# Patient Record
Sex: FEMALE | Race: BLACK OR AFRICAN AMERICAN
[De-identification: names, ages, dates, MRNs, and addresses within clinical notes are randomized per-mention and may not be internally consistent; named-entity substitution may affect disease eponyms.]

---

## 2017-03-18 NOTE — DIAGNOSTIC IMAGING REPORT
EXAMINATION: PELVIC ULTRASOUND



CLINICAL HISTORY: lower abd pain on right     



COMPARISON STUDY:  None



FINDINGS: 

The uterus measured 7.6 cm. Central gestational sac appearing process at the

level of the uterine fundus. A fetal pole is not identified..

The endometrial stripe measured difficult to define.

The right ovary measured not well seen.

The left ovary measured 2.3 cm maximum dimension. Normal vascular flow.

Study is difficult to interpret as there is a suggestion of bulky masslike

changes within the pelvis and uterine regions. These appear to be primarily

solid with Minimal cystic components. Etiology is unclear. Endometriosis is a

diagnostic possibility as are multiple exophytic fibroids.

There was no evidence of pathologic free pelvic fluid.



IMPRESSION:  

1. Difficult scan to interpret.

2. Abnormal soft tissue occupying the bulk of the pelvis and pelvic cul-de-sac

in a periuterine distribution.

3. Possible empty intrauterine gestational sac

4. Exophytic fibroids, endometriomas, versus other diagnostic possibilities

exist.

5. CT study of the abdominal and pelvic region is suggested







Electronically signed by:  David Max M.D.

3/18/2017 3:27 PM



Dictated Date/Time:  3/18/2017 3:23 PM

## 2017-03-18 NOTE — EMERGENCY ROOM VISIT NOTE
History


Report prepared by Nadege:  Olvin Gillette


Under the Supervision of:  Dr. Ole Bae D.O.


First contact with patient:  13:28


Chief Complaint:  PELVIC  PAIN


Stated Complaint:  PELVIC PAIN





History of Present Illness


The patient is a 21 year old female who presents to the Emergency Room with 

complaints of persistent lower pelvic pain beginning about 3 days ago. She 

notes she is not due for her cycle for another 10 days. She notes she started 

having white, non-foul smelling discharge beginning this morning, and began 

bleeding just recently this afternoon while in the ER. She reports it is not 

normal for her to have discharge before her period. The patient notes having 

similar pain before in the past which was determined to be an ovarian cyst that 

was not removed. She has not had any pain or burning with urination. She has 

been unable to pass gas, and has not have a full bowel movement. She is 

sexually active and still has her gallbladder and appendix. She has not started 

any new medications.  She does admit to a history of previous ovarian cyst.  

Last mental.  Was just about 2 weeks ago.





   Source of History:  patient


   Onset:  about 3 days ago


   Position:  pelvis (lower)


   Quality:  other (pelvic pain)


   Timing:  other (persistent)


   Associated Symptoms:  No urinary symptoms


Note:


The patient reports having white discharge, vaginal bleeding, and difficulty 

with bowel movements.





Review of Systems


See HPI for pertinent positives & negatives. A total of 10 systems reviewed and 

were otherwise negative.





Past Medical & Surgical


Medical Problems:


(1) Lupus


(2) Pulmonary embolism


(3) TIA (transient ischemic attack)








Family History


No pertinent family history stated.





Social History


Smoking Status:  Current Every Day Smoker


Alcohol Use:  none


Drug Use:  none


Occupation Status:  [employer] student





Current/Historical Medications


Scheduled


Dextromethorphan-Phenylephrine (Day Time Cold/Flu Relief), 30 ML PO DAILY


Hydroxychloroquine Sulfate (Plaquenil), 200 MG PO DAILY


Mycophenolate Mofetil (Cellcept), 1,000 MG PO DAILY


Warfarin Sodium (Coumadin), 8 MG PO DAILY@2200





Scheduled PRN


Ibuprofen (Advil), 200-600 MG PO Q4H PRN for Pain


Oxycodone Immediate Rel Tab (Roxicodone Ir), 5 MG PO Q6H PRN for Pain





Allergies


Coded Allergies:  


     Heparin (Verified  Allergy, Intermediate, Hives, 3/18/17)


 Patient reports that she can take LMWH without problem;


 gets hives with UFH


     Fondaparinux (Verified  Allergy, Mild, HIVES, 3/18/17)





Physical Exam


Vital Signs











  Date Time  Temp Pulse Resp B/P Pulse Ox O2 Delivery O2 Flow Rate FiO2


 


3/18/17 18:15  67  98/66 100 Room Air  


 


3/18/17 16:34  65 20 116/69 95   


 


3/18/17 13:00 36.9 86 20 118/87 97 Room Air  











Physical Exam


GENERAL: Sitting up in bed, alert, well appearing, well nourished, no distress, 

non-toxic 


EYE EXAM: normal conjunctiva


OROPHARYNX: no exudate, no erythema, lips, buccal mucosa, and tongue normal and 

mucous membranes are moist


NECK: supple, no nuchal rigidity, no adenopathy, non-tender


LUNGS: Clear to auscultation. Normal chest wall mechanics


HEART: no murmurs, S1 normal and S2 normal 


ABDOMEN: abdomen soft; minimal tenderness in the suprapubic and right lower 

quadrant; normo-active bowel sounds, no masses, no rebound or guarding. 


BACK: Back is symmetrical on inspection and there is no deformity, no midline 

tenderness, no CVA tenderness. 


SKIN: no rashes and no bruising 


UPPER EXTREMITIES: upper extremities are grossly normal. 


LOWER EXTREMITIES: No pitting edema.


NEURO EXAM: Normal sensorium, cranial nerves II-XII grossly intact, normal 

speech,  no gross weakness of arms, no gross weakness of legs. Gross sensation 

intact.


PELVIC: Normal external genitalia; normal vaginal mucosa; small amount of blood 

in the vaginal vault; cervix is closed.





Medical Decision & Procedures


ER Provider


Diagnostic Interpretation:


Radiology results have been interpreted by the radiologist and reviewed by me.





ABDOMEN AND PELVIS CT WITH IV AND ORAL CONTRAST





FINDINGS: Lung bases are clear. Liver spleen and pancreas are uniform. Kidneys


enhance uniformly.





The upper abdominal bowel pattern is nonobstructive. Gallbladder is negative for


distention.





There is a small amount of free fluid within the right paracolic gutter region.


Terminal ileum is unremarkable. The appendix appears to be identified medially


anterior to the right iliopsoas musculature shows no definitive inflammatory


change.





Evaluation of the pelvis shows a small amount of free ascites. Appears to be a


complex soft tissue mass displacing the uterus anteriorly. The ovaries are not


identifiable as a independent entities. The soft tissue mass component with


cystic components measures 9 x 9 cm. There is again a trace amount of free


ascites. Endometrium at the level of the uterine fundus is thickened at 2 cm.


There is fluid within the central canal at the level of the fundus. Bladder is


midline.





Etiology is uncertain on the absence of additional data. Possibility of


endometrioma versus diffuse pelvic inflammatory change demonstrated


consideration. There are pregnancy test was positive the possibility of a


ruptured ectopic may be a consideration. Nevertheless, the bulk of the findings


appear to relate to soft tissue findings.





IMPRESSION: 





1. Large abnormal soft tissue mass occupying the bulk of the low pelvis.








2. Maximum dimensions are 9 x 9 cm, with evidence for small components of cystic


degeneration.


3. Diagnostic considerations must include endometriosis, pelvic inflammatory


change, versus a variety of other potential etiologies.


4. The appendix is not well seen although its segmental visualized components


appear unremarkable.


5. Trace ascites within the pelvis and right paracolic gutter region.


6. Somewhat distended fundal central uterine canal with mild endometrial


prominence 


7. GYN consultation is suggested





Electronically signed by:  David Max M.D.


3/18/2017 5:03 PM





Dictated Date/Time:  3/18/2017 4:55 PM





EXAMINATION: PELVIC ULTRASOUND





FINDINGS: 


The uterus measured 7.6 cm. Central gestational sac appearing process at the


level of the uterine fundus. A fetal pole is not identified..


The endometrial stripe measured difficult to define.


The right ovary measured not well seen.


The left ovary measured 2.3 cm maximum dimension. Normal vascular flow.


Study is difficult to interpret as there is a suggestion of bulky masslike


changes within the pelvis and uterine regions. These appear to be primarily


solid with Minimal cystic components. Etiology is unclear. Endometriosis is a


diagnostic possibility as are multiple exophytic fibroids.


There was no evidence of pathologic free pelvic fluid.





IMPRESSION:  


1. Difficult scan to interpret.


2. Abnormal soft tissue occupying the bulk of the pelvis and pelvic cul-de-sac


in a periuterine distribution.


3. Possible empty intrauterine gestational sac


4. Exophytic fibroids, endometriomas, versus other diagnostic possibilities


exist.


5. CT study of the abdominal and pelvic region is suggested





Electronically signed by:  David Max M.D.


3/18/2017 3:27 PM





Dictated Date/Time:  3/18/2017 3:23 PM





Laboratory Results


3/18/17 14:05








Red Blood Count 4.60, Mean Corpuscular Volume 81.7, Mean Corpuscular Hemoglobin 

27.6, Mean Corpuscular Hemoglobin Concent 33.8, Mean Platelet Volume 10.2, 

Neutrophils (%) (Auto) 69.0, Lymphocytes (%) (Auto) 22.2, Monocytes (%) (Auto) 

6.7, Eosinophils (%) (Auto) 1.5, Basophils (%) (Auto) 0.2, Neutrophils # (Auto) 

3.30, Lymphocytes # (Auto) 1.06, Monocytes # (Auto) 0.32, Eosinophils # (Auto) 

0.07, Basophils # (Auto) 0.01





3/18/17 14:05

















Test


  3/18/17


14:05 3/18/17


14:10 3/18/17


17:30


 


White Blood Count


  4.78 K/uL


(4.8-10.8) 


  


 


 


Red Blood Count


  4.60 M/uL


(4.2-5.4) 


  


 


 


Hemoglobin


  12.7 g/dL


(12.0-16.0) 


  


 


 


Hematocrit 37.6 % (37-47)   


 


Mean Corpuscular Volume


  81.7 fL


() 


  


 


 


Mean Corpuscular Hemoglobin


  27.6 pg


(25-34) 


  


 


 


Mean Corpuscular Hemoglobin


Concent 33.8 g/dl


(32-36) 


  


 


 


Platelet Count


  257 K/uL


(130-400) 


  


 


 


Mean Platelet Volume


  10.2 fL


(7.4-10.4) 


  


 


 


Neutrophils (%) (Auto) 69.0 %   


 


Lymphocytes (%) (Auto) 22.2 %   


 


Monocytes (%) (Auto) 6.7 %   


 


Eosinophils (%) (Auto) 1.5 %   


 


Basophils (%) (Auto) 0.2 %   


 


Neutrophils # (Auto)


  3.30 K/uL


(1.4-6.5) 


  


 


 


Lymphocytes # (Auto)


  1.06 K/uL


(1.2-3.4) 


  


 


 


Monocytes # (Auto)


  0.32 K/uL


(0.11-0.59) 


  


 


 


Eosinophils # (Auto)


  0.07 K/uL


(0-0.5) 


  


 


 


Basophils # (Auto)


  0.01 K/uL


(0-0.2) 


  


 


 


RDW Standard Deviation


  42.6 fL


(36.4-46.3) 


  


 


 


RDW Coefficient of Variation


  14.4 %


(11.5-14.5) 


  


 


 


Immature Granulocyte % (Auto) 0.4 %   


 


Immature Granulocyte # (Auto)


  0.02 K/uL


(0.00-0.02) 


  


 


 


Anion Gap


  11.0 mmol/L


(3-11) 


  


 


 


Est Creatinine Clear Calc


Drug Dose 132.5 ml/min 


  


  


 


 


Estimated GFR (


American) > 150.0 


  


  


 


 


Estimated GFR (Non-


American 134.2 


  


  


 


 


BUN/Creatinine Ratio 11.8 (10-20)   


 


Calcium Level


  9.4 mg/dl


(8.5-10.1) 


  


 


 


Total Bilirubin


  0.3 mg/dl


(0.2-1) 


  


 


 


Direct Bilirubin


  < 0.1 mg/dl


(0-0.2) 


  


 


 


Aspartate Amino Transf


(AST/SGOT) 22 U/L (15-37) 


  


  


 


 


Alanine Aminotransferase


(ALT/SGPT) 21 U/L (12-78) 


  


  


 


 


Alkaline Phosphatase


  140 U/L


() 


  


 


 


Total Protein


  9.9 gm/dl


(6.4-8.2) 


  


 


 


Albumin


  3.7 gm/dl


(3.4-5.0) 


  


 


 


Lipase


  75 U/L


() 


  


 


 


Urine Color  YELLOW  


 


Urine Appearance  CLEAR (CLEAR)  


 


Urine pH  6.0 (4.5-7.5)  


 


Urine Specific Gravity


  


  >= 1.030


(1.000-1.030) 


 


 


Urine Protein  1+ (NEG)  


 


Urine Glucose (UA)  NEG (NEG)  


 


Urine Ketones  TRACE (NEG)  


 


Urine Occult Blood  3+ (NEG)  


 


Urine Nitrite  NEG (NEG)  


 


Urine Bilirubin  NEG (NEG)  


 


Urine Urobilinogen  NEG (NEG)  


 


Urine Leukocyte Esterase  NEG (NEG)  


 


Urine RBC


  


  5-10 /hpf


(0-4) 


 


 


Urine WBC  1-5 /hpf (0-5)  


 


Urine Epithelial Cells  >30 /lpf (0-5)  


 


Urine Bacteria  1+ (NEG)  


 


Urine Hyaline Casts


  


  5-10 /lpf


(0-5) 


 


 


Urine Mucus


  


  PRESENT (NONE


PRSENT) 


 


 


Urine Pregnancy Test  NEG (NEG)  





Laboratory results per my review.





Medications Administered











 Medications


  (Trade)  Dose


 Ordered  Sig/Tyler


 Route  Start Time


 Stop Time Status Last Admin


Dose Admin


 


 Sodium Chloride


  (Nss 1000ml)  1,000 ml @ 


 999 mls/hr  Q1H1M STAT


 IV  3/18/17 13:52


 3/18/17 14:52 DC 3/18/17 14:10


999 MLS/HR


 


 Ondansetron HCl


  (Zofran Inj)  4 mg  NOW  STAT


 IV  3/18/17 13:52


 3/18/17 13:54 DC 3/18/17 14:10


4 MG


 


 Ketorolac


 Tromethamine


  (Toradol Inj)  30 mg  NOW  STAT


 IV  3/18/17 13:52


 3/18/17 13:54 DC 3/18/17 14:11


30 MG











ED Course


ED COURSE: 


Vital signs were reviewed and showed normal. 


The patients medical record was reviewed


The above diagnostic studies were performed and reviewed.


ED treatments and interventions as stated above. 





1342: The patient was evaluated in room C1B. A complete history and physical 

examination was performed.





1352: Ordered Toradol Inj 30 mg IV, Zofran Inj 4 mg IV, and NSS 1,000 ml @ 999 

mls/hr IV.





1745: I spoke with Dr. Tobias. He will see the patient in his office in 2 

days.





1610: I reassessed the patient and she is doing well.





1615: Upon reevaluation, the patient is doing well.I discussed my findings with 

the patient and she understands and agrees with the treatment plan.   


Based on the patients age, coexisting illnesses, exam and lab findings the 

decision to treat as an outpatient was made.


The patient remained stable while under my care.


The patient appeared well at the time of discharge.





Medical Decision


Differential diagnoses includes but is not limited to gastritis, peptic ulcer 

disease, GERD, gallbladder disease, pancreatitis, small bowel obstruction, 

acute coronary syndrome, pericarditis, ischemic bowel, irritable bowel disease, 

irritable bowel syndrome, appendicitis, diverticulitis, malignancy, hernia, 

urinary tract infection, torsion, perforation, trauma, infectious. 





Patient is a 21-year-old female who presents the ER for abdominal pain which 

started 3 days ago.  She notes that it's in her lower pelvic region.  She does 

describe a faint white vaginal discharge which has now turned into vaginal 

bleeding today.  Last menstrual cycle was about 2 weeks ago.  Urine pregnancy 

was negative.  She is due for period  in about 10 days.  Labs show no 

significant leukocytosis or anemia.  BMP along with LFTs, bilirubin and lipase 

is unremarkable.  UA was contaminated with greater than 30 epithelial cells.  

GC ankle many were pending.  Ultrasound of pelvis shows a large pelvic mass 

with flow to the left ovary.  The right ovary was not well seen secondary to a 

mass.  CT of her abdomen pelvis confirms this and they're uncertain of the true 

cause of it.  Patient rested fairly comfortably and was discussed with 

gynecology.  Patient was given a dose of Toradol and felt significantly better.

  She is discharged follow-up with oncology on Monday.  She was given a 

prescription of OxyIR. I favor that her symptoms are likely secondary to this 

large mass. Discussed with Pt concerning signs and symptoms to watch out for. 

Pt was instructed to follow up with their PCP and discussed with the patient 

their option to return to the ED at anytime for persistent or worsening 

symptoms. The appropriate anticipatory guidance and out-patient management, 

including indications for return to the emergency department, were explained at 

length to the patient and understood.





[place] Drug Monitoring Program


Search Results:  patient reviewed within database, no issues identified





Consults


Time Called:  1740


Consulting Physician:  Dr. Tobias


Returned Call:  1745


 I spoke with Dr. Tobias. He will see the patient in his office in 2 days.





Impression





 Primary Impression:  


 Pelvic mass in female


 Additional Impression:  


 Vaginal bleeding





Scribe Attestation


The scribe's documentation has been prepared under my direction and personally 

reviewed by me in its entirety. I confirm that the note above accurately 

reflects all work, treatment, procedures, and medical decision making performed 

by me.





Departure Information


Dispostion


Home / Self-Care





Prescriptions





Oxycodone Immediate Rel Tab (ROXICODONE IR) 5 Mg Tab


5 MG PO Q6H Y for Pain, #15 TAB


   Prov: Ole Bae,          3/18/17





Referrals


No Doctor, Assigned (PCP)





Patient Instructions


ED Pelvic Pain [facility], [facility]





Additional Instructions





Please follow up with your gynecologist within the next 24-48 hours.  Any 

worsening of your symptoms, please return to the ED immediately.  This includes 

passing out, worsening bleeding, worsening pain, persistent nausea vomiting, or 

any other concerning signs or symptoms from your standpoint.





You were given medications during this visit that will inhibit your ability to 

drive, operate machinery and work. Please do NOT drive, operate machinery or 

work for the next 12hrs. You were also given a prescription for a narcotic/

OxyIR. While taking this medication you should also not drive, operate 

machinery and or work.





Again please call your OB/GYN doctor on Monday morning to be seen as soon as 

possible. 





Please refrain from any intercourse.  Absolutely nothing in the vagina until you

're seen by your gynecologist.





Problem Qualifiers

## 2017-03-18 NOTE — DIAGNOSTIC IMAGING REPORT
ABDOMEN AND PELVIS CT WITH IV AND ORAL CONTRAST



CT DOSE: 262.26 mGy.cm



HISTORY: Pelvic pain  right lower abd pain 



TECHNIQUE: Multiaxial CT images of the abdomen and pelvis were performed

following the use of intravenous and oral contrast.



COMPARISON STUDY: None.



FINDINGS: Lung bases are clear. Liver spleen and pancreas are uniform. Kidneys

enhance uniformly.



The upper abdominal bowel pattern is nonobstructive. Gallbladder is negative for

distention.



There is a small amount of free fluid within the right paracolic gutter region.

Terminal ileum is unremarkable. The appendix appears to be identified medially

anterior to the right iliopsoas musculature shows no definitive inflammatory

change.



Evaluation of the pelvis shows a small amount of free ascites. Appears to be a

complex soft tissue mass displacing the uterus anteriorly. The ovaries are not

identifiable as a independent entities. The soft tissue mass component with

cystic components measures 9 x 9 cm. There is again a trace amount of free

ascites. Endometrium at the level of the uterine fundus is thickened at 2 cm.

There is fluid within the central canal at the level of the fundus. Bladder is

midline.



Etiology is uncertain on the absence of additional data. Possibility of

endometrioma versus diffuse pelvic inflammatory change demonstrated

consideration. There are pregnancy test was positive the possibility of a

ruptured ectopic may be a consideration. Nevertheless, the bulk of the findings

appear to relate to soft tissue findings.



IMPRESSION: 



1. Large abnormal soft tissue mass occupying the bulk of the low pelvis.





2. Maximum dimensions are 9 x 9 cm, with evidence for small components of cystic

degeneration.

3. Diagnostic considerations must include endometriosis, pelvic inflammatory

change, versus a variety of other potential etiologies.

4. The appendix is not well seen although its segmental visualized components

appear unremarkable.

5. Trace ascites within the pelvis and right paracolic gutter region.

6. Somewhat distended fundal central uterine canal with mild endometrial

prominence 

7. GYN consultation is suggested







Electronically signed by:  David Max M.D.

3/18/2017 5:03 PM



Dictated Date/Time:  3/18/2017 4:55 PM

## 2017-03-20 NOTE — EMERGENCY ROOM VISIT NOTE
History


Report prepared by Nadege:  Antonio Sofia


Under the Supervision of:  Dr. Hany Arana M.D.


First contact with patient:  20:21


Chief Complaint:  ABDOMINAL PAIN


Stated Complaint:  ABDOMINAL PAIN





History of Present Illness


The patient is a 21 year old female who presents to the Emergency Room with 

complaints of persistent lower abdominal pain that worsened prior to arrival 

today. She was diagnosed last year with a mass in her pelvis at [facility], 

but has not been followed up about it since then. She also has lupus with a 

history of a blood clot in her lung. Earlier today, the patient passed out, and 

she gets lightheaded when standing up. She has been nauseous, and was here a 

few days ago for similar symptoms. While she was here, she had a vaginal exam. 

The patient notes that she started having diarrhea yesterday, and she has had 

episodes where she has lost control of her bowels. She says that if she stands 

up, her bowels go right through her. The patient is currently bleeding from her 

cycle, but the bleeding is not heavy. She denies any vomiting, as she says that 

it "hurts too much to vomit". The patient has been anemic before, and had to 

have a blood transfusion.





   Source of History:  patient


   Onset:  Prior to arrival today


   Position:  abdomen (lower)


   Timing:  worsening


   Associated Symptoms:  + LOC, + diarrhea, + nausea, No vomiting


Note:


Associated symptoms: Lightheaded when standing up. Episodes where she loses 

control of bowels.





Review of Systems


See HPI for pertinent positives & negatives. A total of 10 systems reviewed and 

were otherwise negative.





Past Medical & Surgical


Medical Problems:


(1) Lupus


(2) Pulmonary embolism


(3) TIA (transient ischemic attack)








Family History





No pertinent family history





Social History


Smoking Status:  Current Every Day Smoker


Alcohol Use:  none


Drug Use:  none


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





Current/Historical Medications


Scheduled


Dextromethorphan-Phenylephrine (Day Time Cold/Flu Relief), 30 ML PO DAILY


Hydroxychloroquine Sulfate (Plaquenil), 200 MG PO DAILY


Mycophenolate Mofetil (Cellcept), 1,000 MG PO DAILY


Warfarin Sodium (Coumadin), 8 MG PO DAILY@2200





Scheduled PRN


Ibuprofen (Advil), 200-600 MG PO Q4H PRN for Pain


Oxycodone Immediate Rel Tab (Roxicodone Ir), 5 MG PO Q6H PRN for Pain





Allergies


Coded Allergies:  


     Heparin (Verified  Allergy, Intermediate, Hives, 3/20/17)


 Patient reports that she can take LMWH without problem;


 gets hives with UFH


     Fondaparinux (Verified  Allergy, Mild, HIVES, 3/20/17)





Physical Exam


Vital Signs











  Date Time  Temp Pulse Resp B/P Pulse Ox O2 Delivery O2 Flow Rate FiO2


 


3/21/17 00:40 36.5 87 20 118/81 100   


 


3/21/17 00:20 37.0 95      


 


3/21/17 00:20 37.0 98 18 119/80 100   


 


3/21/17 00:20 37.0 98 18 119/80 100   


 


3/21/17 00:15 36.5 96 18 133/88 100   


 


3/21/17 00:12  90      


 


3/21/17 00:10 36.8 98 18 113/76 99   


 


3/20/17 23:43  94 20 105/73 99 Room Air  


 


3/20/17 22:17  115 24 110/61 100 Room Air  


 


3/20/17 20:30  114      


 


3/20/17 20:10 36.9 106 20 113/83 100 Room Air  











Physical Exam


GENERAL: Patient is tired appearing and in moderate distress. 


HEENT: No acute trauma, normocephalic atraumatic, mucous membranes moist, no 

nasal congestion, no scleral icterus. Pale conjunctiva.


NECK: No stridor, no adenopathy, no meningismus, trachea is midline.


LUNGS: No dyspnea. Clear to auscultation and equal bilaterally. No wheeze, no 

rhonchi.


HEART: Mildly tachycardic heart rate and regular rhythm.  No murmurs, rubs, 

gallops appreciated.


ABDOMEN: Soft, tenderness to palpation of lower abdomen, bowel sounds positive, 

no masses appreciated, no peritonitis.


BACK: No midline tenderness, no CVA tenderness


EXTREMITIES: Normal motion all extremities, no cyanosis, no edema.


NEUROLOGIC: Alert and oriented, no acute motor or sensory deficits, no focal 

weakness, cranial nerves grossly intact.


SKIN: No rash, no jaundice, no diaphoresis.


PELVIC: Normal external labia/vulva.  No bruising, lacerations, lesions.  

Normal vagina with minimal blood.  Anterior Cervix without tenderness.  Large 

uterus vs mass with TTP over right adenexa.  


Rectal: Large anterior rectal vault mass with minimal stool in rectal vault.





Medical Decision & Procedures


Laboratory Results


3/20/17 21:00








Red Blood Count 2.64, Mean Corpuscular Volume 82.6, Mean Corpuscular Hemoglobin 

28.0, Mean Corpuscular Hemoglobin Concent 33.9, Mean Platelet Volume 11.5, 

Neutrophils (%) (Auto) 83.1, Lymphocytes (%) (Auto) 8.8, Monocytes (%) (Auto) 

7.5, Eosinophils (%) (Auto) 0.2, Basophils (%) (Auto) 0.1, Neutrophils # (Auto) 

8.06, Lymphocytes # (Auto) 0.85, Monocytes # (Auto) 0.73, Eosinophils # (Auto) 

0.02, Basophils # (Auto) 0.01





3/20/17 21:00

















Test


  3/20/17


21:00 3/20/17


23:25


 


White Blood Count


  9.70 K/uL


(4.8-10.8) 


 


 


Red Blood Count


  2.64 M/uL


(4.2-5.4) 


 


 


Hemoglobin


  7.4 g/dL


(12.0-16.0) 


 


 


Hematocrit 21.8 % (37-47)  


 


Mean Corpuscular Volume


  82.6 fL


() 


 


 


Mean Corpuscular Hemoglobin


  28.0 pg


(25-34) 


 


 


Mean Corpuscular Hemoglobin


Concent 33.9 g/dl


(32-36) 


 


 


Platelet Count


  217 K/uL


(130-400) 


 


 


Mean Platelet Volume


  11.5 fL


(7.4-10.4) 


 


 


Neutrophils (%) (Auto) 83.1 %  


 


Lymphocytes (%) (Auto) 8.8 %  


 


Monocytes (%) (Auto) 7.5 %  


 


Eosinophils (%) (Auto) 0.2 %  


 


Basophils (%) (Auto) 0.1 %  


 


Neutrophils # (Auto)


  8.06 K/uL


(1.4-6.5) 


 


 


Lymphocytes # (Auto)


  0.85 K/uL


(1.2-3.4) 


 


 


Monocytes # (Auto)


  0.73 K/uL


(0.11-0.59) 


 


 


Eosinophils # (Auto)


  0.02 K/uL


(0-0.5) 


 


 


Basophils # (Auto)


  0.01 K/uL


(0-0.2) 


 


 


RDW Standard Deviation


  42.6 fL


(36.4-46.3) 


 


 


RDW Coefficient of Variation


  14.2 %


(11.5-14.5) 


 


 


Immature Granulocyte % (Auto) 0.3 %  


 


Immature Granulocyte # (Auto)


  0.03 K/uL


(0.00-0.02) 


 


 


Red Blood Cell Morphology Unremarkable  


 


Prothrombin Time


  66.7 SECONDS


(9.0-12.0) 


 


 


Prothromb Time International


Ratio 5.8 (0.9-1.1) 


  


 


 


Activated Partial


Thromboplast Time 91.3 SECONDS


(21.0-31.0) 


 


 


Partial Thromboplastin Ratio 3.5  


 


Anion Gap


  10.0 mmol/L


(3-11) 


 


 


Est Creatinine Clear Calc


Drug Dose 181.0 ml/min 


  


 


 


Estimated GFR (


American) > 150.0 


  


 


 


Estimated GFR (Non-


American 148.7 


  


 


 


BUN/Creatinine Ratio 22.0 (10-20)  


 


Calcium Level


  8.1 mg/dl


(8.5-10.1) 


 


 


Total Bilirubin


  0.3 mg/dl


(0.2-1) 


 


 


Direct Bilirubin


  < 0.1 mg/dl


(0-0.2) 


 


 


Aspartate Amino Transf


(AST/SGOT) 16 U/L (15-37) 


  


 


 


Alanine Aminotransferase


(ALT/SGPT) 12 U/L (12-78) 


  


 


 


Alkaline Phosphatase


  99 U/L


() 


 


 


Troponin I


  < 0.015 ng/ml


(0-0.045) 


 


 


Total Protein


  7.4 gm/dl


(6.4-8.2) 


 


 


Albumin


  2.8 gm/dl


(3.4-5.0) 


 


 


Lipase


  46 U/L


() 


 


 


Human Chorionic Gonadotropin,


Qual NEG (NEG) 


  


 


 


Urine Color  YELLOW 


 


Urine Appearance  CLEAR (CLEAR) 


 


Urine pH  7.0 (4.5-7.5) 


 


Urine Specific Gravity


  


  1.010


(1.000-1.030)


 


Urine Protein  NEG (NEG) 


 


Urine Glucose (UA)  NEG (NEG) 


 


Urine Ketones  2+ (NEG) 


 


Urine Occult Blood  NEG (NEG) 


 


Urine Nitrite  NEG (NEG) 


 


Urine Bilirubin  NEG (NEG) 


 


Urine Urobilinogen  NEG (NEG) 


 


Urine Leukocyte Esterase  NEG (NEG) 


 


Urine Pregnancy Test  NEG (NEG) 











Medications Administered











 Medications


  (Trade)  Dose


 Ordered  Sig/Tyler


 Route  Start Time


 Stop Time Status Last Admin


Dose Admin


 


 Fentanyl Citrate


  (Fentanyl Inj)  75 mcg  NOW  STAT


 IV  3/20/17 20:31


 3/20/17 20:32 DC 3/20/17 20:52


75 MCG


 


 Ondansetron HCl 4


 mg  4 mg  NOW  STAT


 IV  3/20/17 20:31


 3/20/17 20:32 DC 3/20/17 20:51


4 MG


 


 Sodium Chloride  1,000 ml @ 


 999 mls/hr  Q1H1M STAT


 IV  3/20/17 20:31


 3/20/17 21:43 DC 3/20/17 20:52


999 MLS/HR


 


 Phytonadione 10


 mg/Sodium Chloride  51 ml @ 


 102 mls/hr  ONE  ONCE


 IV  3/20/17 22:30


 3/20/17 22:59 DC 3/20/17 22:54


102 MLS/HR


 


 Prothrombin


 Complex Concent


  (Human)/Syringe


  (Kcentra/Syringe)  80 ml @ 10


 mls/min  TODAY@2245


 IV  3/20/17 22:45


 3/20/17 23:59 DC 3/20/17 22:44


10 MLS/MIN











ED Course


2021: The patient was evaluated in room A9B. A complete history and physical 

exam was performed.





2031: Ordered NSS 1000 ml @ 999 mls/hr IV, Zofran Inj 4 mg IV, Fentanyl Inj 75 

mcg IV.





Medical Decision


21 yr old female with pmh of lupus, dvt (right leg age 13), pe (age 13), cva (

right arm weakness 2014).  She is on coumadin for this.  She notes worsening 

lower abdominal pain over last few days.  Seen in ED 2 days ago for pain and 

vaginal bleeding and found to have 9x9cm pelvic soft tissue mass.  She notes 

she has been trying to deal with pain at home though is it becoming too severe.

  She has syncopal event this afternoon which lead to return to ED.  She notes 

severe pelvic pain, improvement of bleeding, and inability to have normal BM 

over the last week.  Admits loss of bowel control this afternoon though she 

feels it was because urge came on so quickly and she has large amount very much 

liquid stool.


We obtained records form [facility] where she was evaluated in June 2016 for 

pelvic mass and pain.  Felt to be endometrioma which by MRI 5/2016 was 7.5 x 

4.5 cm and US 3/2016 was 6cm.  It was felt surgical approach with too many 

complications at that time and thus she was discharged with outpatient 

monitoring planned.  


Patient given Fentanyl for pain, Zofran for nausea and IV fluids.  Exam 

consistent with large pelvic mass which seems to be more to right.  I suspect 

it is so large that it is blocking ability to have normal BM, thus rather than 

diarrhea she may be having severe constipation and resultant liquid flow around 

mass only.  


She now has had precipitous drop in Hgb from 12 just a few days ago to 7 today.

  With severely elevated INR, increasing pain and syncope clearly needs repeat 

imaging to rule out intraabdominal hemorrhage.  Mild tachy though BP remaining 

stable.


She if feeling much improved with IV fentanyl and even after a few hours in 

minimal distress.


CT showing large amount of intraabdominal free fluid along with mass.  


Given K-centra along with Vit K.  4 Units PRBC ordered.  I made clear to 

patient the risks of clotting given her history though we need to stop her from 

bleeding out and she agrees and understands.  Furthermore, she freely gave 

verbal and written consent for blood transfusion (as well later on the central 

line).


Anesthesia and Gyn down to help with case.  Gyn feels that patient needs to be 

transferred to higher level of care, for which I agree.  


Discussed at length with Dr Colunga at [facility] who accepts for 

transfer along with Critical Care Anesthesia.  Agree with plan to transfuse and 

if stable transfer.


Will need transfer via air.


She is stable and still feeling well.


Many repeat evaluations of patient.  Discussed case with her mother and case 

management did so with her grandmother.


With just single IV after discussion with Anesthesia they will place right IJ 

central line due to her coagulopathy, need for further blood products and her 

illness.  This will allow safer transport and will give further monitoring time 

in ED prior to transfer.


Given some fentanyl/zofran prior to central line placement.


With Central Line in she received initial 2 Unites PRBC via warmer over short 

amount of time.


CXR with deep right IJ thus we in sterile fashion moved line back 6-7 cm.  No 

pneumothorax.


Patient stable, feeling well.  BP and HR excellent.  Getting 2 more units over 

standard 1 hour time.


She is happy and very pleasant throughout entire stay.





Impression





 Primary Impression:  


 Pelvic mass in female


 Additional Impressions:  


 Acute blood loss anemia


 Intra abdominal hemorrhage


 Supratherapeutic INR





Critical Care


I have personally spent greater than 180 minutes of critical care time in the 

direct management of this patient.  This was a life/limb threatening event.  

This includes time spent evaluating patient, direct bedside care, chart review, 

placing orders, interpretation of diagnostic studies, discussion with 

consultants, patient, and family members, as well as other required patient 

management activities.


This 180 minutes is in excess of all separately billable procedures.





Scribe Attestation


The scribe's documentation has been prepared under my direction and personally 

reviewed by me in its entirety. I confirm that the note above accurately 

reflects all work, treatment, procedures, and medical decision making performed 

by me.





Departure Information


Referrals


[facility] (PCP)





Patient Instructions


My [facility]





Problem Qualifiers

## 2017-03-20 NOTE — DIAGNOSTIC IMAGING REPORT
CT ABD/PELVIS IV CONTRAST ONLY



CLINICAL HISTORY: abdominal pain, mass, anemia, elevated INR, worsening pain



COMPARISON STUDY:  3/18/2017



TECHNIQUE: Following the IV administration of 91 mL of Optiray-320, CT scan of

the abdomen and pelvis was performed from the lung bases to the proximal femurs.

Images are reviewed in the axial, sagittal, and coronal planes. IV contrast was

administered without complication.



CT DOSE: 256.45 mGy.cm



FINDINGS:



Lower chest: The heart is normal in size and configuration, without pericardial

effusion. The lung bases and pleural spaces are clear.



Liver: The contrast-enhanced liver is normal in size, contour, and attenuation.

There is no intrahepatic biliary ductal dilatation. The hepatic veins and portal

veins are patent.



Gallbladder: Unremarkable.



Spleen: Normal in size and attenuation.



Pancreas: Unremarkable.



Adrenal glands: Unremarkable.



Kidneys: There is symmetric renal cortical enhancement. The kidneys are normal

in size without hydronephrosis.



Bowel: There are no transition zones indicate bowel obstruction.



Peritoneum: There is moderate free intraperitoneal fluid, likely cysts secondary

to hemoperitoneum. This finding is increased significantly when compared the

preceding study.



Vasculature: The abdominal aorta is normal in course and caliber.



Adenopathy: None.



Pelvic viscera: Evaluation is very limited given the lack of oral contrast.

There is a heterogeneous pelvic mass which cannot be  from multiple

pelvic bowel loops as well as the presumed hemoperitoneum. Gynecological

consultation is recommended.



Skeletal structures: No destructive osseous lesions are seen.



IMPRESSION:  

1. Moderate free peritoneal fluid. This exceeds water attenuation and may

represent hemoperitoneum

2. Large heterogeneous pelvic mass (hematoma versus other).

3. Emergent gynecological consultation is recommended.







Electronically signed by:  Gene Wood M.D.

3/20/2017 10:29 PM



Dictated Date/Time:  3/20/2017 10:22 PM

## 2017-03-21 NOTE — PROCEDURE NOTE
Procedure Note


Date of Service


Mar 21, 2017.





Procedure Note


Patient tachycardic, losing blood in to abdomen.  Patient was seen and 

evaluated ER doctor and Dr Denney and [place] [facility] [place] 

complex nature of the surgery and anticipated prolonged ICU course.  However, 

she does need resuscitation with fluids and blood first.  Multiple attempts at 

peripheral IV access resulted in only 1 peripheral 20G IV, thus I was asked to 

place a central line to improve access and continue resuscitation.  Due to the 

coagulopathy, anesthesia was consulted for line placement.  Risks and benefits 

were discussed.  Due to her history of L Carotid dissection, the R IJ site was 

chosen.





Procedure:  R Neck was prepped and draped in a sterile fashion.  Skin was 

localized with lidocaine 1%.  RIJ was cannulated under dynamic ultrasound 

guidance and a wire was placed.  US was used to confirm wire placement in the 

vein and not in the artery.  The vein was dilated and a 7F 3L catheter was 

placed.  All 3 ports were aspirated and flushed successfully.  A biopatch was 

placed and the line was sutured in place before a sterile dressing.  CXR 

ordered.

## 2017-03-21 NOTE — CONSULTATION REPORT
DATE OF CONSULTATION:  2017

 

REASON FOR CONSULT:  Hemoperitoneum, pelvic mass.

 

HISTORY OF PRESENT ILLNESS:  Kyra is a 21-year-old -American female,

 0, who presents to the Emergency Department for the second time in 2

days.  The patient has a known pelvic mass that she has been followed for at

[facility] as she lives in that area.  She has a history of abdominal pain

and an ultrasound at an outside facility showed it is a heterogeneous mass

suspicious for adnexal, uterine or urachal mass.  This ultrasound revealed

right ovary measuring 6.1 x 3.9 x 4.7 cm with a 4 cm simple cyst, the left

ovary measured 2.4 x 2.4 x 3.1 cm.  She had an MRI shortly thereafter

revealing right ovary measuring 5.5 x 4.2 and her left ovary measuring 4 x

2.3 cm and it was suspected that she had an endometrioma.  This mass was

asymptomatic.  She had a followup ultrasound and was told that this was

stable and so there was no further followup until 2 days ago when she had

increasing onset of abdominal pain.  It began approximately 3 days prior to

her first ED evaluation on 2017.  She noted some white discharge

beginning in the morning and began bleeding in the afternoon, this period

came a little bit late.  She has had similar pain as this before and it was

determined that this was the ovarian cyst.  She had no urinary symptoms.  She

had been unable to pass gas and has not had a full bowel movement.  She is

sexually active.  At that point in time, she had an abdominal pelvis CT which

showed a large abnormal soft tissue mass occupying the bulk of the low

pelvis, the maximal dimension was 9.9 cm with evidence of soft small

components of cystic degeneration.  The appendix was not well visualized. 

There was trace ascites within the pelvis and the right pericolic gutter. 

There was a somewhat distended fundal central uterine cavity with mild

endometrial prominence.  Urine pregnancy test was negative.  The pelvic

ultrasound revealed right ovary that was not well seen, the left ovary

measured 2.3 cm in maximal dimension.  There appears to be primary solid and

minimally cystic bulky mass-like changes within the pelvis and uterine

regions.  The etiology included endometriosis, endometrioma, multiple

exophytic fibroids.  Her hemoglobin at that time was 12.7, white count was

4.78, platelet count was 257,000.  She was evaluated.  She was given IV

Toradol and Zofran.  Gynecologist was consulted on the phone and followup in

2 days.  She was given precautions and sent home.

 

She returns to the Emergency Department today.  She notes that she had sudden

acute onset of worsening pain prior to arrival.  She also passed out for a

short period of time because of the pain.  She gets lightheaded and dizzy

when standing up.  She has been nauseated.  She started having some loose

stools yesterday.  The patient is currently in her cycle which started on

Saturday.

 

The patient's history is complicated.  She has a history of systemic lupus

erythematosus diagnosed when she was age 12 and then within that year she had

a lower extremity DVT with a pulmonary embolus.  She has been on

anticoagulation since that time.  She was first started on Lovenox, and then

when she was 15, she started on Coumadin.  She followed in the Coumadin

Clinic here.  She was last seen in Coumadin Clinic 2 weeks ago where she had

blood work done and was told to follow up for another blood draw in a couple

weeks.  She was unable to do that because of illness.  Her current Coumadin

dose is 8 mg and she was not told that she would need to change that at all. 

Additionally, the patient also had a couple of TIAs about 2 years ago.  She

also has a history of carotid artery dissection that was diagnosed within the

year secondary to severe migraine, but no intervention was performed.

 

PAST OBSTETRICS AND GYNECOLOGIC HISTORY:  This patient notes her last

menstrual period was on Saturday.  She has a period every 2-3 weeks and it

has been irregular since this ovarian mass was first identified in May.  She

is sexually active.  She has been with her current partner since August.  She

admits to 8 sexual partners.  She admits that she has a history of recently

diagnosed HSV disease and was seen early in this year by our physician

assistant, Sera Villanueva.  She uses condoms for contraception with every act 
of

intercourse.  She has never been pregnant.  She thinks she might have had a

Pap smear but it has been quite some time ago.

 

ALLERGIES:  HEPARIN AND FONDAPARINUX.  WITH HEPARIN, SHE GETS HIVES.

 

MEDICATIONS:  Include a daytime cold relief formula, Plaquenil 200 mg daily,

CellCept 1000 mg daily, and Coumadin 8 mg daily.  She also takes Advil as

needed and was recently given Roxicodone when she was in the Emergency

Department for discomfort.

 

SURGICAL HISTORY:  She has had dental surgery, tonsils and adenoids removed,

and eye surgery.

 

PAST MEDICAL HISTORY:  As noted above for lupus, pulmonary embolism with DVT,

TIA, carotid artery dissection, also keloids.

 

SOCIAL HISTORY:  The patient is a college student, she studies criminology. 

She does admit to occasional tobacco products.  She denies regular alcohol

use.  She denies any drug use.

 

REVIEW OF SYSTEMS:  See as noted above.  The patient notes that her pain is

worse anytime she moves or when she has tried to have a bowel movement.

 

PHYSICAL EXAMINATION:

GENERAL:  This is a well-developed, well-nourished -American female

who appears to be resting comfortably in the bed.  She notes that when she is

at rest, her pain is a 2/10.

NECK:  Supple without thyromegaly or lymphadenopathy.

ABDOMEN:  Soft.  There is tenderness to palpation of the lower abdomen.  No

appreciable masses.  There is guarding, but no significant rebound.

BACK:  Without costovertebral angle tenderness.

EXTREMITIES:  Normal.

PELVIC:  Normal appearing external female genitalia.  Bimanual exam is the

only exam performed.  The cervix is nulliparous.  It is very anterior.  There

is a large 9-10 cm pelvic mass filling the posterior cul-de-sac.  It is

fixed.  This is confirmed by rectovaginal exam.  There is no stool in the

rectal vault.  The pelvic exam is tender throughout.  With removal of the

vaginal glove, there is some minimal blood noted.  There is tenderness to

palpation of this mass.

VITAL SIGNS:  Temperature is 36.9, pulse 106-115, respirations 20-24.  Two

blood pressures taken, 113/83 and 110/61.

 

LABORATORY DATA:  Today, her hemoglobin is 7.4, hematocrit 21.8, platelets

217,000.  Her INR today is 5.8.  This was not performed with her previous

hospitalization.  Her CMP is essentially normal.  Her qualitative hCG is

negative.

 

IMAGING:  Today is a CT scan which is read as moderate free intraperitoneal

fluid, likely cyst secondary to hemoperitoneum.  This finding has increased

significantly when compared to the preceding study.  Large heterogeneous

pelvic mass.

 

ASSESSMENT:  This is a 21-year-old -American female with a history of

lupus, deep vein thrombosis, pulmonary embolism, and transient ischemic

attack, who has a known 9 x 9 cm pelvic mass.  In the past 2 days, she has

dropped her hemoglobin from 12 to 7.4 and she now has hemoperitoneum.  She is

not, however, hemodynamically unstable at this time.  I am very concerned

that this mass is obviously bleeding, but I am very concerned that this

pelvic mass is fixed and tender.  It is still in the posterior cul-de-sac. 

It is likely adherent to the posterior uterus and bowel.  I feel that it is

in her best interest before the patient does become hemodynamically unstable,

that she be transferred urgently to the [facility] for evaluation

and likely surgical intervention there.  The Emergency Department doctor, Dr. Arana has been working closely with this patient and he has spoken with

Dr. Colunga and the ICU team at the [facility] and they have

accepted her in transfer.  The patient's blood pressure is stable.  She is

slightly tachycardic but certainly not worsening.  While talking to the

patient, she was texting on her phone.  She does not have an acute abdomen at

this time but certainly has signs and symptoms consistent with blood in her

belly with significant increased discomfort with movement.  She will be

transfused between 2-4 units of blood and she will be helicoptered out to the

[facility] who has accepted her in transfer.  I feel that this is

in the patient's best interest because she may need multiple blood products,

particularly platelets or coagulation factors that we are not easily able to

provide her at our facility and that it is probably going to be a quite

complicated surgery requiring colorectal surgeons as well as GYN oncologist

and I feel her best chance of a successful surgery would be at the [facility].  This was discussed with the patient and her mentor who is a

very good friend of hers and is with her.  She is agreeable to transfer and

we plan on transferring the patient to [facility].

 

 

 

KEISHA

## 2017-03-21 NOTE — DIAGNOSTIC IMAGING REPORT
SINGLE VIEW CHEST



CLINICAL HISTORY:  Central venous catheter placement.



FINDINGS: An AP, portable, upright chest radiograph is correlated with chest CT

dated 10/19/2016. The examination is mildly degraded by portable technique and

patient rotation.  A right internal jugular central venous catheter is been

placed. The tip of the catheter projects just above the diaphragm over the right

atrium. The cardiomediastinal silhouette is unremarkable. The lungs and pleural

spaces are clear. No pneumothorax is seen. The bony thorax is grossly intact.



IMPRESSION:



1. The lungs are clear.



2. A right internal jugular central venous catheter has been placed. The tip of

the catheter projects just above the diaphragm over the right atrium.



3. No pneumothorax is seen.







Electronically signed by:  Maldonado Xavier M.D.

3/21/2017 7:12 AM



Dictated Date/Time:  3/21/2017 7:11 AM

## 2017-09-15 ENCOUNTER — HOSPITAL ENCOUNTER (OUTPATIENT)
Dept: HOSPITAL 45 - C.LABSPEC | Age: 22
Discharge: HOME | End: 2017-09-15
Attending: PHYSICIAN ASSISTANT
Payer: COMMERCIAL

## 2017-09-15 DIAGNOSIS — N94.10: ICD-10-CM

## 2017-09-15 DIAGNOSIS — R10.2: Primary | ICD-10-CM

## 2018-03-29 ENCOUNTER — HOSPITAL ENCOUNTER (OUTPATIENT)
Dept: HOSPITAL 45 - C.LABSPEC | Age: 23
Discharge: HOME | End: 2018-03-29
Attending: PEDIATRICS
Payer: COMMERCIAL

## 2018-03-29 DIAGNOSIS — Z86.718: ICD-10-CM

## 2018-03-29 DIAGNOSIS — M32.9: ICD-10-CM

## 2018-03-29 DIAGNOSIS — D68.61: Primary | ICD-10-CM

## 2018-03-29 LAB — INR PPP: 1.9 (ref 0.9–1.1)
